# Patient Record
Sex: MALE | Race: WHITE | Employment: FULL TIME | URBAN - METROPOLITAN AREA
[De-identification: names, ages, dates, MRNs, and addresses within clinical notes are randomized per-mention and may not be internally consistent; named-entity substitution may affect disease eponyms.]

---

## 2018-07-26 ENCOUNTER — HOSPITAL ENCOUNTER (EMERGENCY)
Age: 38
Discharge: HOME | End: 2018-07-26
Attending: EMERGENCY MEDICINE
Payer: COMMERCIAL

## 2018-07-26 VITALS
DIASTOLIC BLOOD PRESSURE: 81 MMHG | OXYGEN SATURATION: 96 % | HEIGHT: 67 IN | BODY MASS INDEX: 22.76 KG/M2 | TEMPERATURE: 97.9 F | HEART RATE: 75 BPM | SYSTOLIC BLOOD PRESSURE: 126 MMHG | RESPIRATION RATE: 16 BRPM | WEIGHT: 145 LBS

## 2018-07-26 DIAGNOSIS — L23.7 ALLERGIC DERMATITIS DUE TO POISON IVY: Primary | ICD-10-CM

## 2018-07-26 PROCEDURE — 99282 EMERGENCY DEPT VISIT SF MDM: CPT

## 2018-07-26 RX ORDER — PREDNISONE 20 MG/1
60 TABLET ORAL ONCE
Status: DISCONTINUED | OUTPATIENT
Start: 2018-07-26 | End: 2018-07-26

## 2018-07-26 RX ORDER — HYDROXYZINE HYDROCHLORIDE 25 MG/1
25 TABLET, FILM COATED ORAL 3 TIMES DAILY PRN
Qty: 9 TABLET | Refills: 0 | Status: SHIPPED | OUTPATIENT
Start: 2018-07-26

## 2018-07-26 RX ORDER — GABAPENTIN 300 MG/1
300 CAPSULE ORAL 3 TIMES DAILY
COMMUNITY

## 2018-07-26 RX ORDER — PREDNISONE 10 MG/1
TABLET ORAL
Qty: 39 TABLET | Refills: 0 | Status: SHIPPED | OUTPATIENT
Start: 2018-07-26

## 2018-07-26 RX ORDER — DEXTROAMPHETAMINE SACCHARATE, AMPHETAMINE ASPARTATE, DEXTROAMPHETAMINE SULFATE AND AMPHETAMINE SULFATE 5; 5; 5; 5 MG/1; MG/1; MG/1; MG/1
25 TABLET ORAL DAILY
COMMUNITY

## 2018-07-26 ASSESSMENT — ENCOUNTER SYMPTOMS
ABDOMINAL PAIN: 0
EYE ITCHING: 0
EYE PAIN: 0
SHORTNESS OF BREATH: 0
EYE DISCHARGE: 0

## 2018-07-26 NOTE — ED PROVIDER NOTES
[facility]  ED  Emergency Department Encounter  Emergency Medicine Resident     Pt Name: Eun Huff  MRN: [id number]  [place] 1980  Date of evaluation: 7/26/18  PCP:  Jerry Chilel       Chief Complaint   Patient presents with    Rash       HISTORY OF PRESENT ILLNESS  (Location/Symptom, Timing/Onset, Context/Setting, Quality, Duration, Modifying Factors, Severity.)      Eun Huff is a 43 y.o. male who presents with Pruritic rash. Patient reports on Saturday he went fishing and was potentially exposed to poison IV/oak. Patient complaining of red pruritic rash on upper extremities neck and face. Patient reports prior history of present IV and states this feels similar. Patient was concerned of potential for spreading rash to coworkers. Patient denies shortness of breath, throat irritation, tongue swelling. Patient denies new lotions, detergents, soaps, no other allergies noted. PAST MEDICAL / SURGICAL / SOCIAL / FAMILY HISTORY      has a past medical history of Anxiety and Depression. has no past surgical history on file. Social History     Social History    Marital status: Single     Spouse name: N/A    Number of children: N/A    Years of education: N/A     Occupational History    Not on file. Social History Main Topics    Smoking status: Current Every Day Smoker     Packs/day: 1.00     Years: 15.00     Types: Cigarettes     Last attempt to quit: 5/2/2014    Smokeless tobacco: Not on file    Alcohol use No      Comment: started drinking etoh again 8/17/13    Drug use: No      Comment: heroin - former    Sexual activity: Not on file     Other Topics Concern    Not on file     Social History Narrative    No narrative on file       History reviewed. No pertinent family history. Allergies:  Patient has no known allergies. Home Medications:  Prior to Admission medications    Medication Sig Start Date End Date Taking?  Authorizing Provider   gabapentin (NEURONTIN) 300 MG capsule Take 300 mg by mouth 3 times daily. .   Yes Historical Provider, MD   amphetamine-dextroamphetamine (ADDERALL) 20 MG tablet Take 25 mg by mouth daily. .   Yes Historical Provider, MD   predniSONE (DELTASONE) 10 MG tablet Please take 60 mg (6 tablets) by mouth once daily for 3 days, then take 40 mg (4 tablets) by mouth once daily for 3 days, then take 20 mg (2 tablets) by mouth once daily for 3 days, then take 10 mg (one tablet) by mouth once daily for 3 days. 7/26/18  Yes Misbah Rodriguez DO   hydrOXYzine (ATARAX) 25 MG tablet Take 1 tablet by mouth 3 times daily as needed for Itching 7/26/18  Yes Misbah Rodriguez DO   Buprenorphine HCl-Naloxone HCl (SUBOXONE SL) Place 16 mg under the tongue    Historical Provider, MD       REVIEW OF SYSTEMS    (2-9 systems for level 4, 10 or more for level 5)      Review of Systems   Constitutional: Negative for chills and fever. HENT: Negative. Eyes: Negative for pain, discharge, itching and visual disturbance. Respiratory: Negative for shortness of breath. Cardiovascular: Negative for chest pain. Gastrointestinal: Negative for abdominal pain. Skin: Positive for rash. PHYSICAL EXAM   (up to 7 for level 4, 8 or more for level 5)      INITIAL VITALS:   /81   Pulse 75   Temp 97.9 °F (36.6 °C) (Oral)   Resp 16   Ht 5' 7\" (1.702 m)   Wt 145 lb (65.8 kg)   SpO2 96%   BMI 22.71 kg/m²     Physical Exam   Constitutional: He is oriented to person, place, and time. He appears well-developed. No distress. HENT:   Head: Normocephalic and atraumatic. Eyes: EOM are normal. Pupils are equal, round, and reactive to light. Neck: Normal range of motion. Neck supple. Cardiovascular: Normal rate. Pulmonary/Chest: Effort normal. No stridor. No respiratory distress. He has wheezes. Abdominal: Soft. There is no tenderness. Neurological: He is alert and oriented to person, place, and time.    Skin:   Bilateral

## 2018-07-26 NOTE — ED TRIAGE NOTES
Pt states that he has had an exposure to what he believes is poison ivy and is experiencing a rash to bilateral forearms, hands, neck and lt eyelid. 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   Pt states he believies the cream is making the rash worse

## 2019-05-29 ENCOUNTER — HOSPITAL ENCOUNTER (EMERGENCY)
Age: 39
Discharge: HOME | End: 2019-05-29
Attending: EMERGENCY MEDICINE
Payer: COMMERCIAL

## 2019-05-29 ENCOUNTER — APPOINTMENT (OUTPATIENT)
Dept: CT IMAGING | Age: 39
End: 2019-05-29
Payer: COMMERCIAL

## 2019-05-29 VITALS
DIASTOLIC BLOOD PRESSURE: 71 MMHG | HEART RATE: 68 BPM | RESPIRATION RATE: 17 BRPM | SYSTOLIC BLOOD PRESSURE: 146 MMHG | TEMPERATURE: 97.9 F | OXYGEN SATURATION: 98 %

## 2019-05-29 DIAGNOSIS — S39.012A STRAIN OF LUMBAR REGION, INITIAL ENCOUNTER: Primary | ICD-10-CM

## 2019-05-29 PROCEDURE — 99283 EMERGENCY DEPT VISIT LOW MDM: CPT

## 2019-05-29 PROCEDURE — 96372 THER/PROPH/DIAG INJ SC/IM: CPT

## 2019-05-29 PROCEDURE — 72131 CT LUMBAR SPINE W/O DYE: CPT

## 2019-05-29 PROCEDURE — 6360000002 HC RX W HCPCS: Performed by: PHYSICIAN ASSISTANT

## 2019-05-29 RX ORDER — KETOROLAC TROMETHAMINE 30 MG/ML
30 INJECTION, SOLUTION INTRAMUSCULAR; INTRAVENOUS ONCE
Status: COMPLETED | OUTPATIENT
Start: 2019-05-29 | End: 2019-05-29

## 2019-05-29 RX ORDER — CYCLOBENZAPRINE HCL 10 MG
10 TABLET ORAL NIGHTLY PRN
Qty: 15 TABLET | Refills: 0 | Status: SHIPPED | OUTPATIENT
Start: 2019-05-29

## 2019-05-29 RX ORDER — ORPHENADRINE CITRATE 30 MG/ML
60 INJECTION INTRAMUSCULAR; INTRAVENOUS ONCE
Status: COMPLETED | OUTPATIENT
Start: 2019-05-29 | End: 2019-05-29

## 2019-05-29 RX ORDER — NAPROXEN 500 MG/1
500 TABLET ORAL 2 TIMES DAILY
Qty: 20 TABLET | Refills: 0 | Status: SHIPPED | OUTPATIENT
Start: 2019-05-29

## 2019-05-29 RX ADMIN — ORPHENADRINE CITRATE 60 MG: 30 INJECTION INTRAMUSCULAR; INTRAVENOUS at 09:09

## 2019-05-29 RX ADMIN — KETOROLAC TROMETHAMINE 30 MG: 30 INJECTION, SOLUTION INTRAMUSCULAR; INTRAVENOUS at 09:08

## 2019-05-29 ASSESSMENT — ENCOUNTER SYMPTOMS
COUGH: 0
SHORTNESS OF BREATH: 0
ABDOMINAL PAIN: 0
NAUSEA: 0
COLOR CHANGE: 0
DIARRHEA: 0
SORE THROAT: 0
VOMITING: 0
BACK PAIN: 1

## 2019-05-29 ASSESSMENT — PAIN DESCRIPTION - DESCRIPTORS: DESCRIPTORS: ACHING

## 2019-05-29 ASSESSMENT — PAIN DESCRIPTION - PROGRESSION: CLINICAL_PROGRESSION: GRADUALLY WORSENING

## 2019-05-29 ASSESSMENT — PAIN DESCRIPTION - PAIN TYPE: TYPE: ACUTE PAIN

## 2019-05-29 ASSESSMENT — PAIN SCALES - GENERAL
PAINLEVEL_OUTOF10: 10
PAINLEVEL_OUTOF10: 10

## 2019-05-29 ASSESSMENT — PAIN DESCRIPTION - LOCATION: LOCATION: BACK

## 2019-05-29 ASSESSMENT — PAIN DESCRIPTION - ONSET: ONSET: SUDDEN

## 2019-05-29 ASSESSMENT — PAIN DESCRIPTION - ORIENTATION: ORIENTATION: LOWER

## 2019-05-29 NOTE — ED PROVIDER NOTES
[facility]     Emergency Department     Faculty Attestation    I performed a history and physical examination of the patient and discussed management with the resident. I reviewed the residents note and agree with the documented findings and plan of care. Any areas of disagreement are noted on the chart. I was personally present for the key portions of any procedures. I have documented in the chart those procedures where I was not present during the key portions. I have reviewed the emergency nurses triage note. I agree with the chief complaint, past medical history, past surgical history, allergies, medications, social and family history as documented unless otherwise noted below. For Physician Assistant/ Nurse Practitioner cases/documentation I have personally evaluated this patient and have completed at least one if not all key elements of the E/M (history, physical exam, and MDM). Additional findings are as noted. I have personally seen and evaluated the patient. I find the patient's history and physical exam are consistent with the NP/PA documentation. I agree with the care provided, treatment rendered, disposition and follow-up plan. HPI: Pop in back while lifting 60-70 pounds. Able to flex back, pain with walking. Exam: no neurologic deficits. Moving appropriately. Pain in low back. MDM/ED course: CT given sudden onset/pop sensation to evaluate for disc or fracture. Negative for acute traumatic injuries. Treated symptomatically with improvement. Discharged. Follow up with occupational health for work clearance.       Critical Care    Rhys Gamboa MD   Attending Emergency  Physician              Rhys Gamboa MD  05/30/19 1138

## 2019-05-29 NOTE — ED PROVIDER NOTES
[facility] ED  eMERGENCY dEPARTMENT eNCOUnter      Pt Name: Cedrick Soler  MRN: [id number]  [place] 1980  Date of evaluation: 5/29/2019  Provider: [facility], PA-C    CHIEF COMPLAINT       Chief Complaint   Patient presents with    Back Pain     pt with c/o lower back pain that radiates into lower bilateral extremities. pt states that he was picking up something at work pta, and felt a pop in lower back. pt unable to ambulate due to pain. HISTORY OF PRESENT ILLNESS  (Location/Symptom, Timing/Onset, Context/Setting, Quality, Duration, Modifying Factors, Severity.)   Cedrick Soler is a 35 y.o. male who presents to the emergencydepartment after lifting 60-70 pounds at work and feeling a pop in his back when he lifted this approximately around 7:15 AM this morning. He states he is unable to walk without pain. He denies any chest pain or shortness of breath. No abdominal pain. No nausea or vomiting. No fevers or chills. He describes the pain mostly in his low back. He does describe the pain as radiating down to his left thigh. No other symptoms. REVIEW OF SYSTEMS    (2-9 systems for level 4, 10 ormore for level 5)     Review of Systems   Constitutional: Negative for chills, fatigue and fever. HENT: Negative for sore throat. Respiratory: Negative for cough and shortness of breath. Cardiovascular: Negative for chest pain, palpitations and leg swelling. Gastrointestinal: Negative for abdominal pain, diarrhea, nausea and vomiting. Genitourinary: Negative for flank pain and frequency. Musculoskeletal: Positive for back pain. Negative for neck pain and neck stiffness. Skin: Negative for color change. Neurological: Negative for dizziness, facial asymmetry, speech difficulty, weakness, light-headedness, numbness and headaches. PAST MEDICAL HISTORY         Diagnosis Date    Anxiety     Depression        SURGICAL HISTORY     History reviewed.  No friction rub. No murmur heard. Pulmonary/Chest: Effort normal and breath sounds normal. No stridor. No respiratory distress. He has no wheezes. He has no rales. Abdominal: Soft. Bowel sounds are normal. He exhibits no distension and no mass. There is no tenderness. There is no rebound and no guarding. No hernia. No peritoneal signs. Musculoskeletal: Normal range of motion. Patient is neurovascularly intact. Light sensation is intact. Proprioception is within normal limits. Cap refills less than 2 seconds in all extremities. All compartments are soft and compressible. No septic joints noted. Distal pulses and deep tendon reflexes are within normal limits. Motor function is 5 out of 5 bilaterally in all extremities. He does have some minimal midline lumbar tenderness. No saddle anesthesia. No bowel or bladder dysfunction. No urinary retention. No IV drug use. Low suspicion for epidural abscess or cauda equina syndrome. Neurological: He is alert and oriented to person, place, and time. No cranial nerve deficit. Skin: Skin is warm and dry. Capillary refill takes less than 2 seconds. He is not diaphoretic. Psychiatric: He has a normal mood and affect. His behavior is normal. Judgment and thought content normal.   Nursing note and vitals reviewed. DIAGNOSTIC RESULTS       RADIOLOGY:   Non-plain film images such asCT, Ultrasound and MRI are read by the radiologist. Plain radiographic images are visualized and preliminarily interpreted by [address] [facility], PA-C with the below findings:    See below. Interpretation per the Radiologist below, if available at the time of this note:    [place]   Final Result   Negative non-contrast CT of the lumbar spine.                LABS:  Labs Reviewed - No data to display        EMERGENCY DEPARTMENT COURSE and DIFFERENTIAL DIAGNOSIS/MDM:   Vitals:    Vitals:    05/29/19 0851   BP: (!) 146/71   Pulse: 68   Resp: 17   Temp: 97.9 °F (36.6 °C) tablet, Montiel [employer]             (Please note that portions of this note were completed with a voice recognition program.  Efforts were made to edit the dictations but occasionally words are mis-transcribed.)    [address] [facility], SHRUTHI Andrea PA-C  05/29/19 [id number] Ilda Miranda PA-C  05/29/19 4945

## 2019-06-22 ENCOUNTER — HOSPITAL ENCOUNTER (OUTPATIENT)
Dept: MRI IMAGING | Age: 39
Discharge: HOME | End: 2019-06-24
Payer: COMMERCIAL

## 2019-06-22 DIAGNOSIS — S39.012A BACK STRAIN, INITIAL ENCOUNTER: ICD-10-CM

## 2019-06-22 PROCEDURE — 72148 MRI LUMBAR SPINE W/O DYE: CPT

## 2019-06-27 ENCOUNTER — HOSPITAL ENCOUNTER (OUTPATIENT)
Dept: PHYSICAL THERAPY | Facility: CLINIC | Age: 39
Setting detail: THERAPIES SERIES
Discharge: HOME | End: 2019-06-27
Payer: COMMERCIAL

## 2019-06-27 PROCEDURE — 97140 MANUAL THERAPY 1/> REGIONS: CPT

## 2019-06-27 PROCEDURE — 97110 THERAPEUTIC EXERCISES: CPT

## 2019-06-27 PROCEDURE — 97161 PT EVAL LOW COMPLEX 20 MIN: CPT

## 2019-06-27 NOTE — CONSULTS
Issa Tests ? Pain ? Pain No Change Not Tested   RFIS [] [] [] []   KELBY [] [] [] []   RFIL [] [] [] []   REIL [] [] [] []   Prone lying [] [x] [] []   Rep Retract [] [] [] []       OBSERVATION No Deficit Deficit Not Tested Comments   Posture       Forward Head [] [] []    Rounded Shoulders [] [] []    Kyphosis [] [] []    Lordosis [] [] []    Lateral Shift [] [] []    Scoliosis [] [] []    Iliac Crest [] [] []    PSIS [] [] []    ASIS [] [] []    Genu Valgus [] [] []    Genu Varus [] [] []    Genu Recurvatum [] [] []    Pronation [] [] []    Supination [] [] []    Leg Length Discrp [x] [] []    Slumped Sitting [] [] []    Palpation [] [x] [] Left SIJ; Left lower lumbar paraspinals   Sensation [] [] []    Edema [] [] []    Neurological [] [] []          FUNCTION Normal Difficult Unable   Sitting [] [] []   Standing [] [] []   Ambulation [] [] []   Groom/Dress [] [] []   Lift/Carry [] [] []   Stairs [] [] []   Bending [] [] []   OH reach [] [] []   Sit to Stand [] [] []     Comments: Oswestry Low back scale: 29/50; postural assessment: Right lateral shift; L PSIS and Left iliac crest elevated; Left sacral sulcus deeper vs R   manual shift correction in prone; required pillow under hips; standing shift correction with instruction in self shift correction    Assessment:  Problems:    [x] ? Back Pain: Left Lumbar/lateral hip     [x] ? ROM:  Limited all planes    [x] ? Function: poor tolerance to walking and standing  [x] Postural Deviations: Right lateral shift of thorax on pelvis  [x] Gait Deviations- antalgic gait-limited weight bearing on L LE      STG: (to be met in 7  treatments)  1. ? Pain:reduce Left Lumbar/hip/buttock pain to 4/10 or less for work and ADL's  2. ? ROM: restore full, pain free lumbar flexion (without deviation)  3. ? Strength: initiate core stabilization, LE strengthening  4. ? Function: improve Oswestry score for standing (section #6)  5.  Correct postural deviation to neutral

## 2019-07-01 ENCOUNTER — HOSPITAL ENCOUNTER (OUTPATIENT)
Dept: PHYSICAL THERAPY | Facility: CLINIC | Age: 39
Setting detail: THERAPIES SERIES
Discharge: HOME | End: 2019-07-01
Payer: COMMERCIAL

## 2019-07-01 PROCEDURE — G0283 ELEC STIM OTHER THAN WOUND: HCPCS

## 2019-07-01 PROCEDURE — 97110 THERAPEUTIC EXERCISES: CPT

## 2019-07-01 NOTE — FLOWSHEET NOTE
[] [address]  [address]  [phone]  F: [phone] [x] [address]  [facility]   [address]  P: [phone]  F: [phone] [] [place]  [address] [facility]  P: [phone]  F: [phone] [] [address]  [facility]   [address]  [place]: [phone]  F: [phone]     Physical Therapy Daily Treatment Note    Date:  2019  Patient Name:  Jean-Pierre Paez    :  1980  MRN: [id number]   Physician: Ernie Boudreaux MD   Insurance: [facility] [employer]  Medical Diagnosis: Lumbar strain; lumbar radiculopathy          Rehab Codes: M54.32, M54.5, M25.552  Onset Date: 19                     Next 's appt. : tbd      Visit# / total visits: 2/12  Cancels/No Shows: 0    Subjective:    Pain:  [x] Yes  [] No Location: R anterior hip/groin  Pain Rating: (0-10 scale) 3/10  Pain altered Tx:  [] No  [] Yes  Action:  Comments: Pt states he was put on a 10 pound weight restriction at work and they are unable to meet restrictions for safety reasons     Objective:  Modalities: CP/UES in prone pillow under hips) (UES to Left L/S region)  Precautions:  Exercises:            Exercise Reps/ Time Weight/ Level Comments   True  bike X8min                  Prone on elbows X                   Left side glide in standing 3x10   Reviewed from last session; needs verbal cues for proper technique               Extension in lying  5x10    maintain shift correction with sheet;      LTR  x20                                        Other:     Specific Instructions for next treatment: recheck lateral shift and manually correct if needed           Treatment Charges: Mins Units   [x]  Modalities: CP/UES 15 1   [x]  Ther Exercise 26 2   []  Manual Therapy     []  Ther Activities

## 2019-07-02 ENCOUNTER — APPOINTMENT (OUTPATIENT)
Dept: PHYSICAL THERAPY | Facility: CLINIC | Age: 39
End: 2019-07-02
Payer: COMMERCIAL

## 2019-07-03 ENCOUNTER — HOSPITAL ENCOUNTER (OUTPATIENT)
Dept: PHYSICAL THERAPY | Facility: CLINIC | Age: 39
Setting detail: THERAPIES SERIES
Discharge: HOME | End: 2019-07-03
Payer: COMMERCIAL

## 2019-07-03 PROCEDURE — G0283 ELEC STIM OTHER THAN WOUND: HCPCS

## 2019-07-03 PROCEDURE — 97110 THERAPEUTIC EXERCISES: CPT

## 2019-07-08 ENCOUNTER — HOSPITAL ENCOUNTER (OUTPATIENT)
Dept: PHYSICAL THERAPY | Facility: CLINIC | Age: 39
Setting detail: THERAPIES SERIES
Discharge: HOME | End: 2019-07-08
Payer: COMMERCIAL

## 2019-07-08 PROCEDURE — G0283 ELEC STIM OTHER THAN WOUND: HCPCS

## 2019-07-08 PROCEDURE — 97110 THERAPEUTIC EXERCISES: CPT

## 2019-07-08 NOTE — FLOWSHEET NOTE
CP/UES 15 1   [x]  Ther Exercise 28 2   []  Manual Therapy     []  Ther Activities     []  Aquatics     []  Vasocompression     []  Other     Total Treatment time 43 3       Assessment: [x] Progressing toward goals. Pt presents with R lateral shift in standing; has difficulty with L SGIS and shift correction but extension ROM improving slowly    [] No change. [] Other:    STG/LTG  STG: (to be met in 7  treatments)  1. ? Pain:reduce Left Lumbar/hip/buttock pain to 4/10 or less for work and ADL's  2. ? ROM: restore full, pain free lumbar flexion (without deviation)  3. ? Strength: initiate core stabilization, LE strengthening  4. ? Function: improve Oswestry score for standing (section #6)  5. Correct postural deviation to neutral  spine  6. Independent with Home Exercise Program     LTG: (to be met in 12  treatments)  1. Improve Oswestry score for Lifting to able to lift heavy weights without extra pain  2. Reduce pain intensity to mild        Pt. Education:  [] Yes  [] No  [x] Reviewed Prior HEP/Ed  Method of Education: [] Verbal  [] Demo  [] Written  Comprehension of Education:  [] Verbalizes understanding. [] Demonstrates understanding. [x] Needs review. [] Demonstrates/verbalizes HEP/Ed previously given. Plan: [x] Continue per plan of care.    [] Other:      Time In:8a        Time Out: 9:04a  Electronically signed by:  Francois Ferrer, PT

## 2019-07-10 ENCOUNTER — HOSPITAL ENCOUNTER (OUTPATIENT)
Dept: PHYSICAL THERAPY | Facility: CLINIC | Age: 39
Setting detail: THERAPIES SERIES
Discharge: HOME | End: 2019-07-10
Payer: COMMERCIAL

## 2019-07-10 PROCEDURE — G0283 ELEC STIM OTHER THAN WOUND: HCPCS

## 2019-07-10 PROCEDURE — 97110 THERAPEUTIC EXERCISES: CPT

## 2019-07-10 NOTE — FLOWSHEET NOTE
Modalities: CP/UES 15 1   [x]  Ther Exercise 30 2   []  Manual Therapy     []  Ther Activities     []  Aquatics     []  Vasocompression     []  Other     Total Treatment time 45 3       Assessment: [x] Progressing toward goals. See progress note of same date:  Oswestry score 28/50 (29/50 at eval); Right lateral shift reduced from eval but still present when walking/standing. Pain intensity mild vs eval.but patient notes the pain increases as the day goes on. Manual shift correction needed to maintain neutral position. [] No change. [] Other:    STG/LTG  STG: (to be met in 7  treatments) recheck 7/10/19  1. ? Pain:reduce Left Lumbar/hip/buttock pain to 4/10 or less for work and ADL's current pain rating 3/10 (8/10 at eval) - Left hip/Left lumbar  2. ? ROM: restore full, pain free lumbar flexion (without deviation) R lateral shift reduced approximately 50%; Flexion improved to 75° (? 25°) with less deviation to R  3. ? Strength: initiate core stabilization, LE strengthening progress made  4. ? Function: improve Oswestry score for standing (section #6) standing tolerance decreased per Oswestry  5. Correct postural deviation to neutral  spine some improvement but still has R lateral shift when standing/walking  6. Independent with Home Exercise Program goal met     LTG: (to be met in 12  treatments)  1. Improve Oswestry score for Lifting to able to lift heavy weights without extra pain  2. Reduce pain intensity to mild progress made        Pt. Education:  [] Yes  [] No  [x] Reviewed Prior HEP/Ed  Method of Education: [] Verbal  [] Demo  [] Written  Comprehension of Education:  [] Verbalizes understanding. [] Demonstrates understanding. [x] Needs review. [] Demonstrates/verbalizes HEP/Ed previously given. Plan: [x] Continue per plan of care.    [] Other:      Time In:8a        Time Out: 9:12 a  Electronically signed by:  Ana Kapadia, PT

## 2019-07-10 NOTE — PROGRESS NOTES
[] [facility]) - [facility] &  Therapy  [address]  [phone]  F: [phone] [x] [address]  [facility]   [address]  P: [phone]  F: [phone] [] [address] &  Therapy  [address] [facility]  P: [phone]  F: [phone] [] [address]  [facility]   [address]  [place]: [phone]  F: [phone]     Physical Therapy Progress Note    Date: 7/10/2019      Patient: Afshin Chew  : 1980  MRN: [id number]    Physician: Karon Avila MD   Insurance: [facility] [employer]  Medical Diagnosis: Lumbar strain; lumbar radiculopathy          Rehab Codes: M54.32, M54.5, M25.552  Onset Date: 19                     Next 's appt.: 19        Visit# / total visits:                     Cancels/No Shows: 0      Subjective:    Pain:  [x] Yes  [] No          Location: R anterior hip/groin  Pain Rating: (0-10 scale) 3/10  Pain altered Tx:  [] No  [] Yes  Action:     Comments: Pt's cc is Left Lumbar and Left hip pain (3/10 intensity); Pt notes improvement in ADL's but worsens as day goes on        Objective:  Test Measurements/Function:Oswestry score 28/50 (29/50 at eval); Right lateral shift reduced from eval but still present when walking/standing. Pain intensity mild vs eval.but patient notes the pain increases as the day goes on. Manual shift correction needed to maintain neutral position. Assessment:  STG: (to be met in 7  treatments) recheck 7/10/19  1. ? Pain:reduce Left Lumbar/hip/buttock pain to 4/10 or less for work and ADL's current pain rating 3/10 (8/10 at eval) - Left hip/Left lumbar  2. ? ROM: restore full, pain free lumbar flexion (without deviation) R lateral shift reduced approximately 50%;   Flexion improved to 75° (? 25°) with less deviation to R  3. ? Strength: initiate core stabilization, LE strengthening progress made  4. ? Function: improve Oswestry score for standing (section #6) standing tolerance decreased per Oswestry  5. Correct postural deviation to neutral  spine some improvement but still has R lateral shift when standing/walking  6. Independent with Home Exercise Program goal met     LTG: (to be met in 12  treatments)  1. Improve Oswestry score for Lifting to able to lift heavy weights without extra pain  2. Reduce pain intensity to mild progress made        Treatment to Date:  [x] Therapeutic Exercise    [] Modalities:  [] Therapeutic Activity    [] Ultrasound  [x] Electrical Stimulation  [] Gait Training     [] Massage       [] Lumbar/Cervical Traction  [] Neuromuscular Re-education [x] Cold/hotpack [] Iontophoresis: 4 mg/mL  [x] Instruction in Home Exercise Program                     Dexamethasone Sodium  [x] Manual Therapy             Phosphate 40-80 mAmin  [] Aquatic Therapy                   [] Vasocompression/   [] Other:  [] Dry Needling                                           Game Ready        Patient Status:     [x] Continue per initial plan of care. Electronically signed by Sanjana Bryan PT on 7/10/2019 at 7:15 AM      If you have any questions or concerns, please don't hesitate to call. Thank you for your referral.    Physician Signature:________________________________Date:__________________  By signing above or cosigning this note, I have reviewed this plan of care and certify a need for medically necessary rehabilitation services.      *PLEASE SIGN ABOVE AND FAX BACK ALL PAGES*

## 2019-07-12 ENCOUNTER — HOSPITAL ENCOUNTER (OUTPATIENT)
Dept: PHYSICAL THERAPY | Facility: CLINIC | Age: 39
Setting detail: THERAPIES SERIES
Discharge: HOME | End: 2019-07-12
Payer: COMMERCIAL

## 2019-07-12 PROCEDURE — 97110 THERAPEUTIC EXERCISES: CPT

## 2019-07-12 NOTE — FLOWSHEET NOTE
Other:     Specific Instructions for next treatment: Progress core/hip strengthening; lifting mechanics assessment. Educated patient on proper lifting mechanics including use of squat to lift heavy objects and keeping object close to COG/CAESAR when carrying.             Treatment Charges: Mins Units   []  Modalities: CP/UES     [x]  Ther Exercise 40 3   []  Manual Therapy     []  Ther Activities     []  Aquatics     []  Vasocompression     []  Other     Total Treatment time 40 3       Assessment: [x] Progressing toward goals. Patient with improved tolerance to SLR this date. Added prone hip ext and squats to increase hip/core strength. Min VC provided to improve exercise technique with patient demonstrating improvement post cues. R lateral shift cont to be present with gait/extension activities; cont with use of sheet to correct. Patient declined CP/UES this date, noted he will ice at home. [] No change. [] Other:    STG/LTG  STG: (to be met in 7  treatments) recheck 7/10/19  1. ? Pain:reduce Left Lumbar/hip/buttock pain to 4/10 or less for work and ADL's current pain rating 3/10 (8/10 at eval) - Left hip/Left lumbar  2. ? ROM: restore full, pain free lumbar flexion (without deviation) R lateral shift reduced approximately 50%; Flexion improved to 75° (? 25°) with less deviation to R  3. ? Strength: initiate core stabilization, LE strengthening progress made  4. ? Function: improve Oswestry score for standing (section #6) standing tolerance decreased per Oswestry  5. Correct postural deviation to neutral  spine some improvement but still has R lateral shift when standing/walking  6. Independent with Home Exercise Program goal met     LTG: (to be met in 12  treatments)  1. Improve Oswestry score for Lifting to able to lift heavy weights without extra pain  2. Reduce pain intensity to mild progress made        Pt.  Education:  [] Yes  [] No  [x] Reviewed Prior HEP/Ed  Method of

## 2019-07-15 ENCOUNTER — HOSPITAL ENCOUNTER (OUTPATIENT)
Dept: PHYSICAL THERAPY | Facility: CLINIC | Age: 39
Setting detail: THERAPIES SERIES
Discharge: HOME | End: 2019-07-15
Payer: COMMERCIAL

## 2019-07-15 PROCEDURE — 97110 THERAPEUTIC EXERCISES: CPT

## 2019-07-15 NOTE — FLOWSHEET NOTE
[] [facility]) - [facility] CENTER &  Therapy  [address]  [phone]  F: [phone] [x] [address]  [facility]   [address]  P: [phone]  F: [phone] [] [address]  [address]  P: [phone]  F: [phone] [] [address]  [facility]   [address]  [place]: [phone]  F: [phone]     Physical Therapy Daily Treatment Note    Date:  7/15/2019  Patient Name:  Yumiko Marie \"Speedy\"  :  1980  MRN: [id number]   Physician: Bonnie Cunha MD   Insurance: [facility]  Medical Diagnosis: Lumbar strain; lumbar radiculopathy          Rehab Codes: M54.32, M54.5, M25.552  Onset Date: 19                     Next 's appt. : 19      Visit# / total visits:   Cancels/No Shows: 0    Subjective:    Pain:  [] Yes  [x] No Location: R anterior hip/groin- in past  Pain Rating: (0-10 scale) 0/10  Pain altered Tx:  [x] No  [] Yes  Action: N/A    Comments: Reports, \"best I've felt in a long time. \" Reports good tolerance to last visit, only experiencing L hip muscle soreness- \"but I feel like that's what I need; a work out. \"     Objective:  Modalities: CP/UES in prone pillow under hips (UES to Left L/S region)- Declined this date; also on 7/15/2019. Precautions:  Exercises:            Exercise Reps/ Time Weight/ Level Comments   True bike X8min L5 Shortest seat distance for max knee flexion; requests to inc resistance as tolerated- permitted     Wedge str. 3x30\" ea LOW B UE A; reports relief; for gastroc/HS str.      Prone press ups onto hands 3x10   With hip shift correction to R between ea set; cues to maintain hip contact                Left to right side glide in standing 3x10   Reviewed from last session; needs verbal cues for proper technique; educated

## 2019-07-17 ENCOUNTER — HOSPITAL ENCOUNTER (OUTPATIENT)
Dept: PHYSICAL THERAPY | Facility: CLINIC | Age: 39
Setting detail: THERAPIES SERIES
Discharge: HOME | End: 2019-07-17
Payer: COMMERCIAL

## 2019-07-17 PROCEDURE — 97110 THERAPEUTIC EXERCISES: CPT

## 2019-07-17 NOTE — FLOWSHEET NOTE
[] [facility]) [facility] &  Therapy  [address]  [phone]  F: [phone] [x] [address]  [facility]   [address]  P: [phone]  F: [phone] [] [place]  [address] [facility]  P: [phone]  F: [phone] [] [address]  [facility]   [address]  [place]: [phone]  F: [phone]     Physical Therapy Daily Treatment Note    Date:  2019  Patient Name:  Roque Montoya \"Speedy\"  :  1980  MRN: [id number]   Physician: Nicolas Huang MD   Insurance: [facility]  Medical Diagnosis: Lumbar strain; lumbar radiculopathy          Rehab Codes: M54.32, M54.5, M25.552  Onset Date: 19                     Next 's appt. : 19      Visit# / total visits:   Cancels/No Shows: 0    Subjective:    Pain:  [] Yes  [x] No Location: R anterior hip/groin- in past  Pain Rating: (0-10 scale) 2-3/10  Pain altered Tx:  [x] No  [] Yes  Action: N/A    Comments: pt reports he is doing okay, not as good as he was feeling last visit. States he has the pain in the hip again, not terrible, but not as good as it was. Shift present. Objective:  Modalities: CP/UES in prone pillow under hips (UES to Left L/S region)- Declined on 2019. Precautions:  Exercises:            Exercise Reps/ Time Weight/ Level Comments   True bike X8min L5 Shortest seat distance for max knee flexion; requests to inc resistance as tolerated- permitted     Wedge str. 3x30\" ea LOW B UE A; reports relief; for gastroc/HS str.      Prone press ups onto hands 3x10   With hip shift correction to R between ea set; cues to maintain hip contact                Left to right side glide in standing 3x10   Reviewed from last session; needs verbal cues for proper technique; educated regarding ideas of

## 2019-07-19 ENCOUNTER — HOSPITAL ENCOUNTER (OUTPATIENT)
Dept: PHYSICAL THERAPY | Facility: CLINIC | Age: 39
Setting detail: THERAPIES SERIES
Discharge: HOME | End: 2019-07-19
Payer: COMMERCIAL

## 2019-07-19 PROCEDURE — 97110 THERAPEUTIC EXERCISES: CPT

## 2019-07-22 ENCOUNTER — HOSPITAL ENCOUNTER (OUTPATIENT)
Dept: PHYSICAL THERAPY | Facility: CLINIC | Age: 39
Setting detail: THERAPIES SERIES
Discharge: HOME | End: 2019-07-22
Payer: COMMERCIAL

## 2019-07-22 PROCEDURE — 97110 THERAPEUTIC EXERCISES: CPT

## 2019-07-25 ENCOUNTER — HOSPITAL ENCOUNTER (OUTPATIENT)
Dept: PHYSICAL THERAPY | Facility: CLINIC | Age: 39
Setting detail: THERAPIES SERIES
Discharge: HOME | End: 2019-07-25
Payer: COMMERCIAL

## 2019-07-25 PROCEDURE — 97110 THERAPEUTIC EXERCISES: CPT

## 2019-07-31 ENCOUNTER — HOSPITAL ENCOUNTER (OUTPATIENT)
Dept: PHYSICAL THERAPY | Facility: CLINIC | Age: 39
Setting detail: THERAPIES SERIES
Discharge: HOME | End: 2019-07-31
Payer: COMMERCIAL

## 2020-01-27 ENCOUNTER — HOSPITAL ENCOUNTER (EMERGENCY)
Age: 40
Discharge: LEFT BEFORE DISCHARGE | End: 2020-01-27

## 2020-01-27 VITALS
DIASTOLIC BLOOD PRESSURE: 62 MMHG | RESPIRATION RATE: 16 BRPM | OXYGEN SATURATION: 95 % | SYSTOLIC BLOOD PRESSURE: 123 MMHG | TEMPERATURE: 97.8 F | WEIGHT: 148.2 LBS | HEART RATE: 105 BPM | BODY MASS INDEX: 23.26 KG/M2 | HEIGHT: 67 IN

## 2020-01-27 ASSESSMENT — PAIN DESCRIPTION - DESCRIPTORS: DESCRIPTORS: ACHING

## 2020-01-27 ASSESSMENT — PAIN DESCRIPTION - ORIENTATION: ORIENTATION: RIGHT;UPPER

## 2020-01-27 ASSESSMENT — PAIN SCALES - GENERAL: PAINLEVEL_OUTOF10: 2

## 2020-01-27 ASSESSMENT — PAIN DESCRIPTION - PAIN TYPE: TYPE: ACUTE PAIN

## 2020-01-27 ASSESSMENT — PAIN DESCRIPTION - PROGRESSION: CLINICAL_PROGRESSION: NOT CHANGED

## 2020-01-27 ASSESSMENT — PAIN DESCRIPTION - LOCATION: LOCATION: MOUTH;TEETH

## 2020-01-27 ASSESSMENT — PAIN DESCRIPTION - ONSET: ONSET: ON-GOING

## 2020-01-27 ASSESSMENT — PAIN DESCRIPTION - FREQUENCY: FREQUENCY: CONTINUOUS

## 2021-02-13 ENCOUNTER — HOSPITAL ENCOUNTER (EMERGENCY)
Age: 41
Discharge: HOME | End: 2021-02-13
Attending: EMERGENCY MEDICINE
Payer: COMMERCIAL

## 2021-02-13 ENCOUNTER — HOSPITAL ENCOUNTER (EMERGENCY)
Age: 41
Discharge: HOME | End: 2021-02-13
Payer: COMMERCIAL

## 2021-02-13 VITALS
SYSTOLIC BLOOD PRESSURE: 159 MMHG | TEMPERATURE: 98.1 F | HEIGHT: 67 IN | HEART RATE: 94 BPM | RESPIRATION RATE: 20 BRPM | OXYGEN SATURATION: 99 % | DIASTOLIC BLOOD PRESSURE: 65 MMHG | WEIGHT: 161.5 LBS | BODY MASS INDEX: 25.35 KG/M2

## 2021-02-13 VITALS
HEART RATE: 78 BPM | RESPIRATION RATE: 16 BRPM | WEIGHT: 145 LBS | DIASTOLIC BLOOD PRESSURE: 103 MMHG | HEIGHT: 67 IN | OXYGEN SATURATION: 98 % | BODY MASS INDEX: 22.76 KG/M2 | SYSTOLIC BLOOD PRESSURE: 154 MMHG | TEMPERATURE: 98.1 F

## 2021-02-13 DIAGNOSIS — L98.9 SKIN PROBLEM: Primary | ICD-10-CM

## 2021-02-13 DIAGNOSIS — M79.642 LEFT HAND PAIN: Primary | ICD-10-CM

## 2021-02-13 PROCEDURE — 99283 EMERGENCY DEPT VISIT LOW MDM: CPT

## 2021-02-13 PROCEDURE — 90715 TDAP VACCINE 7 YRS/> IM: CPT | Performed by: NURSE PRACTITIONER

## 2021-02-13 PROCEDURE — 6360000002 HC RX W HCPCS: Performed by: NURSE PRACTITIONER

## 2021-02-13 PROCEDURE — 90471 IMMUNIZATION ADMIN: CPT | Performed by: NURSE PRACTITIONER

## 2021-02-13 PROCEDURE — 99282 EMERGENCY DEPT VISIT SF MDM: CPT

## 2021-02-13 RX ADMIN — TETANUS TOXOID, REDUCED DIPHTHERIA TOXOID AND ACELLULAR PERTUSSIS VACCINE, ADSORBED 0.5 ML: 5; 2.5; 8; 8; 2.5 SUSPENSION INTRAMUSCULAR at 23:16

## 2021-02-13 ASSESSMENT — ENCOUNTER SYMPTOMS
NAUSEA: 0
COUGH: 0
ABDOMINAL PAIN: 0
VOMITING: 0
SHORTNESS OF BREATH: 0
CONSTIPATION: 0
DIARRHEA: 0

## 2021-02-13 NOTE — ED PROVIDER NOTES
[address] ED     Emergency Department     Faculty Attestation    I performed a history and physical examination of the patient and discussed management with the resident. I reviewed the residents note and agree with the documented findings and plan of care. Any areas of disagreement are noted on the chart. I was personally present for the key portions of any procedures. I have documented in the chart those procedures where I was not present during the key portions. I have reviewed the emergency nurses triage note. I agree with the chief complaint, past medical history, past surgical history, allergies, medications, social and family history as documented unless otherwise noted below. For Physician Assistant/ Nurse Practitioner cases/documentation I have personally evaluated this patient and have completed at least one if not all key elements of the E/M (history, physical exam, and MDM). Additional findings are as noted. This patient was evaluated in the Emergency Department for symptoms described in the history of present illness. He/she was evaluated in the context of the global COVID-19 pandemic, which necessitated consideration that the patient might be at risk for infection with the SARS-CoV-2 virus that causes COVID-19. Institutional protocols and algorithms that pertain to the evaluation of patients at risk for COVID-19 are in a state of rapid change based on information released by regulatory bodies including the CDC and federal and state organizations. These policies and algorithms were followed during the patient's care in the ED. Patient here with concern for's contamination as he works with \"carbon fibers. \"  He proceeds to demonstrate on his hands that when he pushes the pads of his fingers that you can see the fibers extrude. This is not visible to me nor the resident physicians. He does have a small abrasion on his left middle finger denies any pressure injection injury. All digits pink warm well perfused normal cap refill intact sensation.   Will discharge home      Critical Care     none    Aruna Stevens MD, Regina Wolfe  Attending Emergency  Physician             Aruna Stevens MD  02/13/21 1779

## 2021-02-13 NOTE — ED PROVIDER NOTES
[facility]  ED  Emergency Department  Senior Resident Attestation     I performed a history and physical examination of the patient and discussed management with the sivan resident. I reviewed the sivan residents note and agree with the documented findings and plan of care. Any areas of disagreement are noted on the chart. Case was then discussed with Faculty Attending Supervisor for additional medical management. PERTINENT ATTENDING PHYSICIAN COMMENTS:    HISTORY:   Frances Sargent is a 38 y.o. male who  has a past medical history of Anxiety, Depression, and Drug abuse in remission ([facility] [address] and presents with complaint of having carbon fibers inside of him. Patient states that he was using a low-pressure gun at work to handle the material and he got the substance on himself. He was told by his boss to be evaluated the emergency department. He states that he has been poking himself in the fingers to try and \"drain a substance out of his bloodstream.\"  Patient denies injuring himself with a pressure gun and did not hit his hand at all with the pressure gun. He has not had any weakness numbness or tingling. States that his most recent tetanus was within the last 10 years. PHYSICAL:   Temp: 98.1 °F (36.7 °C),  Pulse: 78, Resp: 16, BP: (!) 154/103, SpO2: 98 %  Gen: WDWN, NAD  Cards: Regular rate and rhythm, distal pulses 2+ bilaterally  Pulm: No acute respiratory distress  Skin: warm, dry. Hands and fingers are dirty. There are a few punctate areas over the left dorsal hand and any ulceration over the left third digit with mild active bleeding due to patient persistently squeezing the area asking if I could see the metal slivers coming out of his blood. Extremities: no clubbing, cyanosis, edema. Intact soft touch distally in bilateral upper extremities. 2+ radial pulses in bilateral upper extremities. Cap refill less than 2 in left upper extremity.     IMPRESSION:   Abrasion, puncture, foreign body    PLAN:   Wound care and follow-up with primary care doctor    CRITICAL CARE TIME:  NONE    Yoly Dyson [place]  Emergency Medicine Resident Physician, PGY-3  02/13/21 2:09 PM    (Please note that portions of this note were completed with a voice recognition program.  Efforts were made to edit the dictations but occasionally words are mis-transcribed.)        Cristhian Petty 1721, DO  Resident  02/13/21 6712

## 2021-02-13 NOTE — ED PROVIDER NOTES
[facility] ED  Emergency Department Encounter  Emergency Medicine Resident     Pt Name: Cheo Bettencourt  GARTH:[id number]  [place] 1980  Date of evaluation: 2/13/21  PCP:  No primary care provider on file. CHIEF COMPLAINT       Chief Complaint   Patient presents with    Skin Problem     skin irritation from Carbon Fiber dust      HISTORY OF PRESENT ILLNESS  (Location/Symptom, Timing/Onset, Context/Setting, Quality, Duration, ModifyingFactors, Severity.)      Cheo Bettencourt is a 38 y.o. male who presents due to concern of carbon fiber dust ingestion. Patient states that yesterday he was working on a car and believes he ingested carbon fiber dust.  He has been reading online and is concerned that the dust has been absorbed and is now seeping out of his fingertips. He has been poking the tip of his index finger with a diabetic needle to try and squeeze the carbon fiber dust out. States that he can see the fibers extrude when he pushes on his fingertips. Has a small wound to his left index finger. States he was seen at a another facility yesterday where he had negative x-rays. No chest pain, shortness of breath, abdominal pain, rash, fever, redness/warmth. Past medical history of anxiety, denies other chronic medical conditions. He does not have a primary care provider. PAST MEDICAL / SURGICAL / SOCIAL /FAMILY HISTORY      has a past medical history of Anxiety, Depression, and Drug abuse in remission ([facility] [address] No other pertinent PMH on review with patient/guardian. has no past surgical history on file. No other pertinent PSH on review with patient/guardian.   Social History     Socioeconomic History    Marital status: Single     Spouse name: Not on file    Number of children: Not on file    Years of education: Not on file    Highest education level: Not on file   Occupational History    Not on file   Social Needs    Financial resource strain: Not on file    Food insecurity Worry: Not on file     Inability: Not on file    Transportation needs     Medical: Not on file     Non-medical: Not on file   Tobacco Use    Smoking status: Current Every Day Smoker     Packs/day: 1.00     Years: 15.00     Pack years: 15.00     Types: Cigarettes     Last attempt to quit: 2014     Years since quittin.7   Substance and Sexual Activity    Alcohol use: No     Comment: started drinking etoh again 12    Drug use: No     Comment: heroin - former    Sexual activity: Not on file   Lifestyle    Physical activity     Days per week: Not on file     Minutes per session: Not on file    Stress: Not on file   Relationships    Social connections     Talks on phone: Not on file     Gets together: Not on file     Attends [employer] service: Not on file     Active member of club or organization: Not on file     Attends meetings of clubs or organizations: Not on file     Relationship status: Not on file    Intimate partner violence     Fear of current or ex partner: Not on file     Emotionally abused: Not on file     Physically abused: Not on file     Forced sexual activity: Not on file   Other Topics Concern    Not on file   Social History Narrative    Not on file       I counseled the patient against using tobacco products. History reviewed. No pertinent family history. No other pertinent FamHx on review with patient/guardian. Allergies:  Patient has no known allergies. Home Medications:  Prior to Admission medications    Medication Sig Start Date End Date Taking? Authorizing Provider   naproxen (NAPROSYN) 500 MG tablet Take 1 tablet by mouth 2 times daily 19   Yanick Dozier PA-C   cyclobenzaprine (FLEXERIL) 10 MG tablet Take 1 tablet by mouth nightly as needed for Muscle spasms 19   Koko [facility] Murtaza Peters PA-C   gabapentin (NEURONTIN) 300 MG capsule Take 300 mg by mouth 3 times daily. [facility]     Historical Provider, MD   amphetamine-dextroamphetamine (ADDERALL) 20 MG tablet Take 25 mg by mouth daily. [facility] Historical Provider, MD   predniSONE (DELTASONE) 10 MG tablet Please take 60 mg (6 tablets) by mouth once daily for 3 days, then take 40 mg (4 tablets) by mouth once daily for 3 days, then take 20 mg (2 tablets) by mouth once daily for 3 days, then take 10 mg (one tablet) by mouth once daily for 3 days. 7/26/18   Allison Collins DO   hydrOXYzine (ATARAX) 25 MG tablet Take 1 tablet by mouth 3 times daily as needed for Itching 7/26/18   Allison Collins DO   Buprenorphine HCl-Naloxone HCl (SUBOXONE SL) Place 16 mg under the tongue    Historical Provider, MD     REVIEW OF SYSTEMS    (2-9 systems for level 4, 10 ormore for level 5)      Review of Systems   Constitutional: Negative for fever. Eyes: Negative for visual disturbance. Respiratory: Negative for cough and shortness of breath. Cardiovascular: Negative for chest pain. Gastrointestinal: Negative for abdominal pain, constipation, diarrhea, nausea and vomiting. Skin: Positive for wound. Negative for rash. Allergic/Immunologic: Negative for immunocompromised state. Neurological: Negative for headaches. Hematological: Does not bruise/bleed easily. PHYSICAL EXAM   (up to 7 for level 4, 8 or more for level 5)      INITIAL VITALS:   BP (!) 154/103   Pulse 78   Temp 98.1 °F (36.7 °C) (Oral)   Resp 16   Ht 5' 7\" (1.702 m)   Wt 145 lb (65.8 kg)   SpO2 98%   BMI 22.71 kg/m²     Physical Exam  Constitutional:       General: He is not in acute distress. Appearance: Normal appearance. HENT:      Head: Normocephalic and atraumatic. Right Ear: External ear normal.      Left Ear: External ear normal.   Eyes:      General:         Right eye: No discharge. Left eye: No discharge. Cardiovascular:      Rate and Rhythm: Normal rate and regular rhythm. Pulses: Normal pulses. Heart sounds: No murmur. Pulmonary:      Effort: Pulmonary effort is normal. No respiratory distress.       Breath sounds: Normal breath sounds. No wheezing, rhonchi or rales. Musculoskeletal:      Comments: Superficial 0.5 cm wound to left index finger. No surrounding erythema or warmth. No active bleeding. Unable to visualize any khoury particles that the patient is describing. No rash. Skin:     Capillary Refill: Capillary refill takes less than 2 seconds. Neurological:      General: No focal deficit present. Mental Status: He is alert. DIFFERENTIAL  DIAGNOSIS     PLAN (LABS / IMAGING / EKG):  No orders of the defined types were placed in this encounter. MEDICATIONS ORDERED:  No orders of the defined types were placed in this encounter. DIAGNOSTIC RESULTS / EMERGENCY DEPARTMENT COURSE / MDM     LABS:  No results found for this visit on 02/13/21. IMPRESSION/MDM/ED COURSE:  38 y.o. male presented with concern of carbon fiber ingestion. Feels that he has absorbed the carbon fibers and they were extruding from his digits. Superficial wound where he has poked his finger with a diabetic needle to get these out. I am unable to visualize any gold particles. No surrounding erythema or warmth concerning for infection. Last tetanus within the past 5 years per patient. Discussed local wound care via patient with bacitracin packets. Provided the patient with a referral to PCP should follow-up with. I discussed signs and symptoms that would require reevaluation in the ED. The patient expressed understanding and agreement with plan. All questions answered. Patient/Guardian requesting discharge. Patient/Guardian was given written and verbal instructions prior to discharge. Patient/Guardian understood and agreed. Patient/Guardian had no further questions. RADIOLOGY:  No orders to display     PROCEDURES:  None    CONSULTS:  None    FINAL IMPRESSION      1.  Skin problem        DISPOSITION / PLAN     DISPOSITION Decision To Discharge 02/13/2021 02:47:16 PM    PATIENT REFERREDTO:  Fouzia Hanks

## 2021-02-14 ASSESSMENT — ENCOUNTER SYMPTOMS
TROUBLE SWALLOWING: 0
COUGH: 0
DIARRHEA: 0
FACIAL SWELLING: 0
VOICE CHANGE: 0
SHORTNESS OF BREATH: 0
VOMITING: 0
CHEST TIGHTNESS: 0
NAUSEA: 0

## 2021-02-14 NOTE — ED PROVIDER NOTES
[facility] ED  eMERGENCY dEPARTMENT eNCOUnter      Pt Name: Hui Cruz  MRN: [id number]  [place] 1980  Date of evaluation: 2/13/2021  Provider: KELSI Rodriguez [id number]       Chief Complaint   Patient presents with    Hand Pain     pt c/o lt hand metal slivers         HISTORY OF PRESENT ILLNESS  (Location/Symptom, Timing/Onset, Context/Setting, Quality, Duration, Modifying Factors, Severity.)   Hui Cruz is a 38 y.o. male who presents to the emergency department via private auto for left hand pain. States he feels that there is carbon fiber in his skin from working on a car today. He was evaluated at [facility] earlier today for the same. The patient also states he saw something moving up his left forearm. He applied a belt to his arm as a tourniquet and was pulling at his skin with nail clippers. States he was able to remove a long metal sliver. He did not bring it with him. Tetanus status is not up to date. Rates his pain 0/10 at this time. Nursing Notes were reviewed. ALLERGIES     Patient has no known allergies. CURRENT MEDICATIONS       Previous Medications    AMPHETAMINE-DEXTROAMPHETAMINE (ADDERALL) 20 MG TABLET    Take 25 mg by mouth daily. Gina Garcia BUPRENORPHINE HCL-NALOXONE HCL (SUBOXONE SL)    Place 16 mg under the tongue    CYCLOBENZAPRINE (FLEXERIL) 10 MG TABLET    Take 1 tablet by mouth nightly as needed for Muscle spasms    GABAPENTIN (NEURONTIN) 300 MG CAPSULE    Take 300 mg by mouth 3 times daily. Gina Garcia     HYDROXYZINE (ATARAX) 25 MG TABLET    Take 1 tablet by mouth 3 times daily as needed for Itching    NAPROXEN (NAPROSYN) 500 MG TABLET    Take 1 tablet by mouth 2 times daily    PREDNISONE (DELTASONE) 10 MG TABLET    Please take 60 mg (6 tablets) by mouth once daily for 3 days, then take 40 mg (4 tablets) by mouth once daily for 3 days, then take 20 mg (2 tablets) by mouth once daily for 3 days, then take 10 mg (one tablet) by mouth once daily for 3 days. PAST MEDICAL HISTORY         Diagnosis Date    Anxiety     Depression     Drug abuse in remission [facility])        SURGICAL HISTORY     History reviewed. No pertinent surgical history. FAMILY HISTORY     History reviewed. No pertinent family history. No family status information on file. SOCIAL HISTORY      reports that he has been smoking cigarettes. He has a 15.00 pack-year smoking history. He has never used smokeless tobacco. He reports that he does not drink alcohol or use drugs. REVIEW OF SYSTEMS    (2-9 systems for level 4, 10 or more for level 5)     Review of Systems   Constitutional: Negative for chills, diaphoresis, fatigue and fever. HENT: Negative for facial swelling, trouble swallowing and voice change. Respiratory: Negative for cough, chest tightness and shortness of breath. Cardiovascular: Negative for chest pain. Gastrointestinal: Negative for diarrhea, nausea and vomiting. Musculoskeletal: Positive for arthralgias and myalgias. Skin: Positive for wound. Neurological: Negative for weakness. Except as noted above the remainder of the review of systems was reviewed and negative. PHYSICAL EXAM    (up to 7 for level 4, 8 or more for level 5)     ED Triage Vitals [02/13/21 2246]   BP Temp Temp Source Pulse Resp SpO2 Height Weight   (!) 159/65 98.1 °F (36.7 °C) Oral 94 20 99 % 5' 7\" (1.702 m) 161 lb 8 oz (73.3 kg)     Physical Exam  Vitals signs reviewed. Constitutional:       General: He is not in acute distress. Appearance: He is well-developed. He is not diaphoretic. Comments: Pt is speaking in full sentences, handling secretions well. Eyes:      General: No scleral icterus. Conjunctiva/sclera: Conjunctivae normal.   Cardiovascular:      Rate and Rhythm: Normal rate and regular rhythm. Heart sounds: Normal heart sounds. Pulmonary:      Effort: Pulmonary effort is normal. No respiratory distress.    Musculoskeletal: Comments: Moves extremities. Gait steady. Skin:     General: Skin is warm and dry. Findings: No rash. Comments: Pt has soiled areas on his hands, likely from working. There are no visible carbon fibers that he is referring to. There are multiple erythematous, open, superficial areas to the left AC. No drainage or active bleeding. Neurological:      Mental Status: He is alert and oriented to person, place, and time. Psychiatric:         Mood and Affect: Mood is anxious. Speech: Speech is rapid and pressured. Behavior: Behavior normal.         EMERGENCY DEPARTMENT COURSE and DIFFERENTIAL DIAGNOSIS/MDM:   Vitals:    Vitals:    02/13/21 2246   BP: (!) 159/65   Pulse: 94   Resp: 20   Temp: 98.1 °F (36.7 °C)   TempSrc: Oral   SpO2: 99%   Weight: 161 lb 8 oz (73.3 kg)   Height: 5' 7\" (1.702 m)       MEDICATIONS GIVEN IN THE ED:  Medications   Tetanus-Diphth-Acell Pertussis (BOOSTRIX) injection 0.5 mL (has no administration in time range)       CLINICAL DECISION MAKING:  The patient presented alert with a nontoxic appearance and was seen in conjunction with Dr. Joyce Montgomery. I did not visualize the fibers that caused him concern. Encouragement was provided that carbon fibers are nontoxic according to research. He was encouraged to no pick at his skin and to not apply a tourniquet. He was advised he may try to remove the fibers he feels with sticky tape; by touching the area with the tape and removing the tape. He was advised to not leave the tape in place and to not ingest the tape. Follow up with a pcp, return to ED if condition worsens. FINAL IMPRESSION      1.  Left hand pain            Problem List  Patient Active Problem List   Diagnosis Code    Depressive disorder, not elsewhere classified F32.9    Opioid type dependence, continuous ([facility] F11.20         DISPOSITION/PLAN   DISPOSITION  DISCHARGE      PATIENT REFERRED TO:   Call Darol Epley to establish care for follow up    Schedule an appointment as soon as possible for a visit         DISCHARGE MEDICATIONS:     New Prescriptions    No medications on file           (Please note that portions of this note were completed with a voice recognition program.  Efforts were made to edit the dictations but occasionally words are mis-transcribed.)    KELSI Ferguson 23, APRN - CNP  02/14/21 7853       Keary Fothergill, MD  02/15/21 6390

## 2021-02-16 NOTE — ED PROVIDER NOTES
eMERGENCY dEPARTMENT eNCOUnter   Independent Attestation     Pt Name: Raquel León  MRN: [id number]  [place] 1980  Date of evaluation: 2/15/21     Raquel León is a 38 y.o. male with CC: Hand Pain (pt c/o lt hand metal slivers)      Based on the medical record the care appears appropriate. I was personally available for consultation in the Emergency Department.     Hailey Hamilton MD  Attending Emergency Physician                  Artie Tolentino MD  02/15/21 9742

## 2021-04-15 ENCOUNTER — HOSPITAL ENCOUNTER (EMERGENCY)
Age: 41
Discharge: HOME | End: 2021-04-15
Attending: EMERGENCY MEDICINE
Payer: COMMERCIAL

## 2021-04-15 VITALS
BODY MASS INDEX: 24.75 KG/M2 | HEART RATE: 74 BPM | RESPIRATION RATE: 16 BRPM | DIASTOLIC BLOOD PRESSURE: 82 MMHG | SYSTOLIC BLOOD PRESSURE: 167 MMHG | HEIGHT: 67 IN | WEIGHT: 157.7 LBS | OXYGEN SATURATION: 96 % | TEMPERATURE: 98.2 F

## 2021-04-15 DIAGNOSIS — Z13.9 ENCOUNTER FOR MEDICAL SCREENING EXAMINATION: Primary | ICD-10-CM

## 2021-04-15 PROCEDURE — 99283 EMERGENCY DEPT VISIT LOW MDM: CPT

## 2021-04-15 RX ORDER — TRAZODONE HYDROCHLORIDE 50 MG/1
TABLET ORAL
COMMUNITY
Start: 2021-04-14

## 2021-04-15 RX ORDER — BUPRENORPHINE 100 MG/1
SOLUTION SUBCUTANEOUS
COMMUNITY
Start: 2021-03-31

## 2021-04-15 NOTE — ED PROVIDER NOTES
EMERGENCY DEPARTMENT ENCOUNTER    Pt Name: Julia Martinez  MRN: [id number]  [place] 1980  Date of evaluation: 4/15/21  CHIEF COMPLAINT       Chief Complaint   Patient presents with    Rash     HISTORY OF PRESENT ILLNESS   Patient is a 44-year-old male who presents the ED for evaluation of rash. Symptoms started 2 months ago. He has noticed \"small worms coming out of his eyes\". He was concerned that his abdomen was infected where his Depo shot was injected. He was concerned that he had ingrown toenail in his right big toe. He has also developed crust-like rash over lower lips intermittently which is since resolved. He is concerned that he has an infectious process and that he needs antibiotics. He works at [employer] and accidentally, fell into a dirty lake that you do not want to fall into\". No other issues at this time. No fevers, cough, shortness of breath, chest pain, abdominal pain. Patient reports normal appetite and good hydration. REVIEW OF SYSTEMS     Review of Systems   All other systems reviewed and are negative. PASTMEDICAL HISTORY     Past Medical History:   Diagnosis Date    Anxiety     Depression     Drug abuse in remission [facility])      SURGICAL HISTORY     History reviewed. No pertinent surgical history. CURRENT MEDICATIONS       Previous Medications    AMPHETAMINE-DEXTROAMPHETAMINE (ADDERALL) 20 MG TABLET    Take 25 mg by mouth daily. Excell Aditya BUPRENORPHINE 100 MG/0.5ML SOSY INJECTION        CYCLOBENZAPRINE (FLEXERIL) 10 MG TABLET    Take 1 tablet by mouth nightly as needed for Muscle spasms    GABAPENTIN (NEURONTIN) 300 MG CAPSULE    Take 300 mg by mouth 3 times daily. Excell Aditya     HYDROXYZINE (ATARAX) 25 MG TABLET    Take 1 tablet by mouth 3 times daily as needed for Itching    NAPROXEN (NAPROSYN) 500 MG TABLET    Take 1 tablet by mouth 2 times daily    PREDNISONE (DELTASONE) 10 MG TABLET    Please take 60 mg (6 tablets) by mouth once daily for 3 days, then take 40 mg (4 tablets) by mouth once daily for 3 days, then take 20 mg (2 tablets) by mouth once daily for 3 days, then take 10 mg (one tablet) by mouth once daily for 3 days. TRAZODONE (DESYREL) 50 MG TABLET         ALLERGIES     has No Known Allergies. FAMILY HISTORY     has no family status information on file. SOCIAL HISTORY       Social History     Tobacco Use    Smoking status: Current Every Day Smoker     Packs/day: 1.00     Years: 15.00     Pack years: 15.00     Types: Cigarettes     Last attempt to quit: 2014     Years since quittin.9    Smokeless tobacco: Never Used   Substance Use Topics    Alcohol use: No     Comment: started drinking etoh again 12    Drug use: No     Comment: heroin - former     PHYSICAL EXAM     INITIAL VITALS: BP (!) 167/82   Pulse 74   Temp 98.2 °F (36.8 °C) (Oral)   Resp 16   Ht 5' 7\" (1.702 m)   Wt 157 lb 11.2 oz (71.5 kg)   SpO2 96%   BMI 24.70 kg/m²    Physical Exam  HENT:      Head: Normocephalic. Right Ear: External ear normal.      Left Ear: External ear normal.      Nose: Nose normal.      Mouth/Throat:      Mouth: Mucous membranes are moist.      Pharynx: Oropharynx is clear. No oropharyngeal exudate or posterior oropharyngeal erythema. Eyes:      Extraocular Movements: Extraocular movements intact. Conjunctiva/sclera: Conjunctivae normal.      Pupils: Pupils are equal, round, and reactive to light. Cardiovascular:      Rate and Rhythm: Normal rate. Pulmonary:      Effort: Pulmonary effort is normal.   Abdominal:      General: Abdomen is flat. Skin:     General: Skin is dry. Findings: No erythema, lesion or rash. Neurological:      Mental Status: He is alert. Mental status is at baseline. Psychiatric:         Mood and Affect: Mood normal.         Behavior: Behavior normal.      Comments:     Limited exam secondary to Covid-19 pandemic.          MEDICAL DECISION MAKING:   The patient is hemodynamically stable, afebrile, nontoxic-appearing. Physical exam unremarkable. Based on history and exam no infectious process identified in physical exam.  ED plan for follow-up with dermatology. DIAGNOSTIC RESULTS   EKG:All EKG's are interpreted by the Emergency Department Physician who either signs or Co-signs this chart in the absence of a cardiologist.        RADIOLOGY:All plain film, CT, MRI, and formal ultrasound images (except ED bedside ultrasound) are read by the radiologist, see reports below, unless otherwisenoted in MDM or here. No orders to display     LABS: All lab results were reviewed by myself, and all abnormals are listed below. Labs Reviewed - No data to display    EMERGENCY DEPARTMENTCOURSE:         Vitals:    Vitals:    04/15/21 0441   BP: (!) 167/82   Pulse: 74   Resp: 16   Temp: 98.2 °F (36.8 °C)   TempSrc: Oral   SpO2: 96%   Weight: 157 lb 11.2 oz (71.5 kg)   Height: 5' 7\" (1.702 m)       The patient was given the following medications while in the emergency department:  No orders of the defined types were placed in this encounter. CONSULTS:  None    FINAL IMPRESSION      1. Encounter for medical screening examination          DISPOSITION/PLAN   DISPOSITION Decision To Discharge 04/15/2021 04:31:56 AM      PATIENT REFERRED TO:  No follow-up provider specified.   DISCHARGE MEDICATIONS:  New Prescriptions    No medications on file     Macy San MD  Attending Emergency Physician                    Ptera Monterroso MD  04/15/21 [id number]

## 2025-05-05 ENCOUNTER — OFFICE VISIT (OUTPATIENT)
Age: 45
End: 2025-05-05

## 2025-05-05 VITALS
DIASTOLIC BLOOD PRESSURE: 80 MMHG | WEIGHT: 145 LBS | OXYGEN SATURATION: 98 % | RESPIRATION RATE: 16 BRPM | BODY MASS INDEX: 22.76 KG/M2 | TEMPERATURE: 98.8 F | HEART RATE: 74 BPM | SYSTOLIC BLOOD PRESSURE: 116 MMHG | HEIGHT: 67 IN

## 2025-05-05 DIAGNOSIS — K02.9 PAIN DUE TO DENTAL CARIES: Primary | ICD-10-CM

## 2025-05-05 RX ORDER — IBUPROFEN 200 MG
800 TABLET ORAL EVERY 8 HOURS PRN
Status: SHIPPED | OUTPATIENT
Start: 2025-05-05

## 2025-05-05 ASSESSMENT — ENCOUNTER SYMPTOMS
SORE THROAT: 0
ALLERGIC/IMMUNOLOGIC NEGATIVE: 1
EYES NEGATIVE: 1
ABDOMINAL PAIN: 0
RHINORRHEA: 0
RESPIRATORY NEGATIVE: 1

## 2025-05-05 NOTE — PROGRESS NOTES
Lito Spears (: 1980) is a 45 y.o. male, New patient, here for evaluation of the following complaint(s): Jaw Pain (Upper left side x 3 days )        History provided by:  Patient   used: No    Dental Pain   This is a chronic problem. The current episode started yesterday. The problem occurs constantly. The problem has been gradually worsening. The pain is at a severity of 6/10. The pain is moderate. Pertinent negatives include no fever. He has tried nothing for the symptoms.        PAST MEDICAL HISTORY    Past Medical History:   Diagnosis Date    Anxiety     Depression     Drug abuse in remission (HCC)        SURGICAL HISTORY    History reviewed. No pertinent surgical history.    CURRENT MEDICATIONS    Current Outpatient Rx   Medication Sig Dispense Refill    amoxicillin-clavulanate (AUGMENTIN) 875-125 MG per tablet Take 1 tablet by mouth 2 times daily for 7 days 14 tablet 0    BUPRENORPHINE 100 MG/0.5ML SOSY injection       traZODone (DESYREL) 50 MG tablet       naproxen (NAPROSYN) 500 MG tablet Take 1 tablet by mouth 2 times daily 20 tablet 0    cyclobenzaprine (FLEXERIL) 10 MG tablet Take 1 tablet by mouth nightly as needed for Muscle spasms 15 tablet 0    gabapentin (NEURONTIN) 300 MG capsule Take 300 mg by mouth 3 times daily..      amphetamine-dextroamphetamine (ADDERALL) 20 MG tablet Take 25 mg by mouth daily..      predniSONE (DELTASONE) 10 MG tablet Please take 60 mg (6 tablets) by mouth once daily for 3 days, then take 40 mg (4 tablets) by mouth once daily for 3 days, then take 20 mg (2 tablets) by mouth once daily for 3 days, then take 10 mg (one tablet) by mouth once daily for 3 days. 39 tablet 0    hydrOXYzine (ATARAX) 25 MG tablet Take 1 tablet by mouth 3 times daily as needed for Itching 9 tablet 0       ALLERGIES    No Known Allergies    FAMILY HISTORY    History reviewed. No pertinent family history.    SOCIAL HISTORY    Social History     Socioeconomic History